# Patient Record
Sex: FEMALE | Race: WHITE | HISPANIC OR LATINO | Employment: STUDENT | URBAN - METROPOLITAN AREA
[De-identification: names, ages, dates, MRNs, and addresses within clinical notes are randomized per-mention and may not be internally consistent; named-entity substitution may affect disease eponyms.]

---
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
## 2023-04-14 ENCOUNTER — OFFICE VISIT (OUTPATIENT)
Dept: MEDICAL GROUP | Facility: PHYSICIAN GROUP | Age: 18
End: 2023-04-14
Payer: COMMERCIAL

## 2023-04-14 ENCOUNTER — HOSPITAL ENCOUNTER (OUTPATIENT)
Facility: MEDICAL CENTER | Age: 18
End: 2023-04-14
Attending: NURSE PRACTITIONER
Payer: COMMERCIAL

## 2023-04-14 ENCOUNTER — HOSPITAL ENCOUNTER (OUTPATIENT)
Dept: LAB | Facility: MEDICAL CENTER | Age: 18
End: 2023-04-14
Attending: NURSE PRACTITIONER
Payer: COMMERCIAL

## 2023-04-14 VITALS
WEIGHT: 148.38 LBS | SYSTOLIC BLOOD PRESSURE: 102 MMHG | TEMPERATURE: 98.6 F | HEIGHT: 64 IN | BODY MASS INDEX: 25.33 KG/M2 | RESPIRATION RATE: 20 BRPM | HEART RATE: 88 BPM | DIASTOLIC BLOOD PRESSURE: 64 MMHG | OXYGEN SATURATION: 99 %

## 2023-04-14 DIAGNOSIS — N89.8 VAGINAL DISCHARGE: ICD-10-CM

## 2023-04-14 DIAGNOSIS — Z32.00 ENCOUNTER FOR PREGNANCY TEST, RESULT UNKNOWN: ICD-10-CM

## 2023-04-14 DIAGNOSIS — Z11.3 SCREENING EXAMINATION FOR SEXUALLY TRANSMITTED DISEASE: ICD-10-CM

## 2023-04-14 LAB
CANDIDA DNA VAG QL PROBE+SIG AMP: POSITIVE
G VAGINALIS DNA VAG QL PROBE+SIG AMP: NEGATIVE
T VAGINALIS DNA VAG QL PROBE+SIG AMP: NEGATIVE

## 2023-04-14 PROCEDURE — 86706 HEP B SURFACE ANTIBODY: CPT

## 2023-04-14 PROCEDURE — 99214 OFFICE O/P EST MOD 30 MIN: CPT | Performed by: NURSE PRACTITIONER

## 2023-04-14 PROCEDURE — 87389 HIV-1 AG W/HIV-1&-2 AB AG IA: CPT

## 2023-04-14 PROCEDURE — 87591 N.GONORRHOEAE DNA AMP PROB: CPT

## 2023-04-14 PROCEDURE — 87340 HEPATITIS B SURFACE AG IA: CPT

## 2023-04-14 PROCEDURE — 87660 TRICHOMONAS VAGIN DIR PROBE: CPT

## 2023-04-14 PROCEDURE — 86803 HEPATITIS C AB TEST: CPT

## 2023-04-14 PROCEDURE — 36415 COLL VENOUS BLD VENIPUNCTURE: CPT

## 2023-04-14 PROCEDURE — 87491 CHLMYD TRACH DNA AMP PROBE: CPT

## 2023-04-14 PROCEDURE — 86780 TREPONEMA PALLIDUM: CPT

## 2023-04-14 PROCEDURE — 87510 GARDNER VAG DNA DIR PROBE: CPT

## 2023-04-14 PROCEDURE — 87480 CANDIDA DNA DIR PROBE: CPT

## 2023-04-14 PROCEDURE — 84703 CHORIONIC GONADOTROPIN ASSAY: CPT

## 2023-04-14 PROCEDURE — 86704 HEP B CORE ANTIBODY TOTAL: CPT

## 2023-04-14 ASSESSMENT — FIBROSIS 4 INDEX: FIB4 SCORE: 0.14

## 2023-04-14 ASSESSMENT — PATIENT HEALTH QUESTIONNAIRE - PHQ9: CLINICAL INTERPRETATION OF PHQ2 SCORE: 0
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
## 2023-04-14 NOTE — PROGRESS NOTES
Subjective  Chief Complaint  Requesting Labs    History of Present Illness  Geovanna Littlejohn is a 17 y.o. female. This established patient is here today requesting lab work for STI testing.    Screening examination for sexually transmitted disease  Geovanna is wanting to get STI testing done today. She states that her boyfriend did cheat on her and she is wanting to be tested for STI's. She states that she did have some vaginal discharge that was different.    Past Medical History    Allergies: Patient has no known allergies.  History reviewed. No pertinent past medical history.  History reviewed. No pertinent surgical history.  Current Outpatient Medications Ordered in Epic   Medication Sig Dispense Refill    drospirenone-ethinyl estradiol (LORYNA) 3-0.02 MG per tablet Take 1 Tablet by mouth every day. 84 Tablet 3     No current Epic-ordered facility-administered medications on file.     Family History:    Family History   Problem Relation Age of Onset    No Known Problems Mother     No Known Problems Father     No Known Problems Sister     No Known Problems Brother     Diabetes Maternal Grandmother     Hyperlipidemia Maternal Grandmother     Other Maternal Grandfather         cirrhosis    Alcohol/Drug Maternal Grandfather     Diabetes Paternal Grandmother     Hyperlipidemia Paternal Grandmother     Diabetes Paternal Grandfather     Hyperlipidemia Paternal Grandfather     No Known Problems Sister       Personal/Social History:    Social History     Tobacco Use    Smoking status: Never    Smokeless tobacco: Never   Vaping Use    Vaping Use: Never used   Substance Use Topics    Alcohol use: No    Drug use: No     Social History     Social History Narrative    Not on file      Review of Systems:   General: Negative for fever/chills and unexpected weight change.    Respiratory:  Negative for cough and dyspnea.    Cardiovascular:  Negative for chest pain and palpitations.  Genitourinary:  Negative for dysuria and  "hematuria. Positive for vaginal discharge.  Musculoskeletal:  Negative for myalgias.   Skin:  Negative for rash.     Objective  Physical Exam:   /64 (BP Location: Left arm, Patient Position: Sitting, BP Cuff Size: Adult)   Pulse 88   Temp 37 °C (98.6 °F) (Temporal)   Resp 20   Ht 1.626 m (5' 4\")   Wt 67.3 kg (148 lb 6 oz)   SpO2 99%  Body mass index is 25.47 kg/m².  General:  Alert and oriented.  Well appearing.  NAD  Neck: Supple without JVD. No lymphadenopathy.  Pulmonary:  Normal effort.  Clear to ausculation without rales, ronchi, or wheezing.  Cardiovascular:  Regular rate and rhythm without murmur, rubs or gallop.   Skin:  Warm and dry.  No obvious lesions.  Musculoskeletal:  No extremity cyanosis, clubbing, or edema.      Assessment/Plan  1. Screening examination for sexually transmitted disease  New diagnosis.  Geovanna is requesting STI testing today.  Discussed with her how to self swab for one of the test, she verbalized understanding.  Discussed that I will notify her of the results and if there is any need for treatment I will also let her know.  - HIV AG/AB Combo Assay Screening; Future  - T.Pallidum AB SAAD (Screening); Future  - Hepatitis C Virus Antibody; Future  - HEP B Surface Antibody; Future  - Hep B Core AB Total; Future  - Hep B Surface Antigen; Future  - Chlamydia/GC, PCR (Genital/Anal swab); Future    2. Vaginal discharge  Acute and ongoing.  She states that she has had some vaginal discharge on and off recently.  Discussed with her how to self swab for one of the test, she verbalized understanding.  - VAGINAL PATHOGENS DNA PANEL; Future    3. Encounter for pregnancy test, result unknown  She is requesting a pregnancy test at this time as she messed up her birth control one day and is wanting to make sure she is not pregnant.  Lab ordered.  - HCG QUAL SERUM; Future      Health Maintenance: Completed    Return in about 1 week (around 4/21/2023) for F/U Labs.    I have placed the above " orders and discussed them with an approved delegating provider.  The MA is performing the above orders under the direction of Dr. Stephanie Sutherland MD/DO.    Discussed that the patient carries some responsibility in management of their health care.    Please note that this dictation was created using voice recognition software. I have made every reasonable attempt to correct obvious errors, but I expect that there are errors of grammar and possibly content that I did not discover before finalizing the note.    ISAC Ordoñez  [facility]

## 2023-04-15 LAB
C TRACH DNA GENITAL QL NAA+PROBE: NEGATIVE
HBV CORE AB SERPL QL IA: NONREACTIVE
HBV SURFACE AB SERPL IA-ACNC: 15.4 MIU/ML (ref 0–10)
HBV SURFACE AG SER QL: NORMAL
HCG SERPL QL: NEGATIVE
HCV AB SER QL: NORMAL
HIV 1+2 AB+HIV1 P24 AG SERPL QL IA: NORMAL
N GONORRHOEA DNA GENITAL QL NAA+PROBE: NEGATIVE
SPECIMEN SOURCE: NORMAL
T PALLIDUM AB SER QL IA: NORMAL

## 2023-04-17 ENCOUNTER — PATIENT MESSAGE (OUTPATIENT)
Dept: HEALTH INFORMATION MANAGEMENT | Facility: OTHER | Age: 18
End: 2023-04-17

## 2023-04-17 ENCOUNTER — DOCUMENTATION (OUTPATIENT)
Dept: HEALTH INFORMATION MANAGEMENT | Facility: OTHER | Age: 18
End: 2023-04-17
Payer: COMMERCIAL

## 2023-04-18 DIAGNOSIS — B37.9 YEAST INFECTION: ICD-10-CM

## 2023-04-18 RX ORDER — FLUCONAZOLE 150 MG/1
150 TABLET ORAL ONCE
Qty: 1 TABLET | Refills: 0 | Status: SHIPPED | OUTPATIENT
Start: 2023-04-18 | End: 2023-04-18

## 2023-06-08 ENCOUNTER — TELEPHONE (OUTPATIENT)
Dept: HEALTH INFORMATION MANAGEMENT | Facility: OTHER | Age: 18
End: 2023-06-08
Payer: COMMERCIAL

## 2023-10-14 ENCOUNTER — APPOINTMENT (OUTPATIENT)
Dept: URGENT CARE | Facility: PHYSICIAN GROUP | Age: 18
End: 2023-10-14
Payer: COMMERCIAL

## 2023-10-14 ENCOUNTER — OFFICE VISIT (OUTPATIENT)
Dept: URGENT CARE | Facility: PHYSICIAN GROUP | Age: 18
End: 2023-10-14
Payer: COMMERCIAL

## 2023-10-14 VITALS
OXYGEN SATURATION: 98 % | HEIGHT: 64 IN | RESPIRATION RATE: 18 BRPM | TEMPERATURE: 98.2 F | DIASTOLIC BLOOD PRESSURE: 76 MMHG | WEIGHT: 144 LBS | BODY MASS INDEX: 24.59 KG/M2 | SYSTOLIC BLOOD PRESSURE: 116 MMHG | HEART RATE: 120 BPM

## 2023-10-14 DIAGNOSIS — R05.1 ACUTE COUGH: ICD-10-CM

## 2023-10-14 DIAGNOSIS — R00.0 TACHYCARDIA: ICD-10-CM

## 2023-10-14 DIAGNOSIS — H66.002 ACUTE SUPPURATIVE OTITIS MEDIA OF LEFT EAR WITHOUT SPONTANEOUS RUPTURE OF TYMPANIC MEMBRANE, RECURRENCE NOT SPECIFIED: ICD-10-CM

## 2023-10-14 DIAGNOSIS — J34.89 SINUS PAIN: ICD-10-CM

## 2023-10-14 PROCEDURE — 3074F SYST BP LT 130 MM HG: CPT | Performed by: STUDENT IN AN ORGANIZED HEALTH CARE EDUCATION/TRAINING PROGRAM

## 2023-10-14 PROCEDURE — 3078F DIAST BP <80 MM HG: CPT | Performed by: STUDENT IN AN ORGANIZED HEALTH CARE EDUCATION/TRAINING PROGRAM

## 2023-10-14 PROCEDURE — 99214 OFFICE O/P EST MOD 30 MIN: CPT | Performed by: STUDENT IN AN ORGANIZED HEALTH CARE EDUCATION/TRAINING PROGRAM

## 2023-10-14 RX ORDER — AMOXICILLIN AND CLAVULANATE POTASSIUM 875; 125 MG/1; MG/1
1 TABLET, FILM COATED ORAL 2 TIMES DAILY
Qty: 14 TABLET | Refills: 0 | Status: SHIPPED | OUTPATIENT
Start: 2023-10-14 | End: 2023-10-21

## 2023-10-14 RX ORDER — BENZONATATE 100 MG/1
100 CAPSULE ORAL 3 TIMES DAILY PRN
Qty: 60 CAPSULE | Refills: 0 | Status: SHIPPED | OUTPATIENT
Start: 2023-10-14

## 2023-10-14 ASSESSMENT — COPD QUESTIONNAIRES: COPD: 0

## 2023-10-14 ASSESSMENT — ENCOUNTER SYMPTOMS
HEADACHES: 1
CHILLS: 0
WHEEZING: 0
DIZZINESS: 0
SINUS PAIN: 1
ABDOMINAL PAIN: 0
CONSTIPATION: 0
FEVER: 0
MYALGIAS: 1
PALPITATIONS: 0
NAUSEA: 0
SHORTNESS OF BREATH: 0
DIARRHEA: 0
SORE THROAT: 0
COUGH: 1
VOMITING: 0

## 2023-10-14 NOTE — PROGRESS NOTES
"Subjective     Geovanna Littlejohn is a 17 y.o. female who presents with Cough (Fatigue, congestion, ear px, body aches, sinus pressure x 1 week)            Geovanna is a 17 y.o. female who presents to urgent care with symptoms of nasal congestion, sinus pain, ear pain and body aches.  Patient states symptoms started a week ago and gradually worsened on Thursday.  Since her states symptoms have remained the same.  Cough is dry in characteristic.  No shortness of breath/wheezing.  Patient states her cheeks feel sore/bruised.  She has also been experiencing headaches.  Patient has been taking OTC cold/flu medications which has not helped much with symptoms.    Cough  This is a new problem. The current episode started in the past 7 days. The cough is Non-productive. Associated symptoms include ear pain, headaches, myalgias and nasal congestion. Pertinent negatives include no chest pain, chills, fever, sore throat, shortness of breath or wheezing. There is no history of asthma, bronchitis, COPD or pneumonia.       Review of Systems   Constitutional:  Positive for malaise/fatigue. Negative for chills and fever.   HENT:  Positive for congestion, ear pain and sinus pain. Negative for sore throat.    Respiratory:  Positive for cough. Negative for shortness of breath and wheezing.    Cardiovascular:  Negative for chest pain and palpitations.   Gastrointestinal:  Negative for abdominal pain, constipation, diarrhea, nausea and vomiting.   Musculoskeletal:  Positive for myalgias.   Neurological:  Positive for headaches. Negative for dizziness.   All other systems reviewed and are negative.             Objective     /76   Pulse (!) 120   Temp 36.8 °C (98.2 °F) (Temporal)   Resp 18   Ht 1.626 m (5' 4\")   Wt 65.3 kg (144 lb)   SpO2 98%   BMI 24.72 kg/m²      Physical Exam  Vitals reviewed.   Constitutional:       General: She is not in acute distress.     Appearance: Normal appearance. She is not toxic-appearing. "   HENT:      Head: Normocephalic and atraumatic.      Right Ear: Tympanic membrane, ear canal and external ear normal.      Left Ear: Ear canal and external ear normal.      Nose: Congestion present.      Right Sinus: Maxillary sinus tenderness and frontal sinus tenderness present.      Left Sinus: Maxillary sinus tenderness and frontal sinus tenderness present.      Mouth/Throat:      Lips: Pink.      Mouth: Mucous membranes are moist.      Pharynx: Oropharynx is clear. Uvula midline.   Eyes:      Extraocular Movements: Extraocular movements intact.      Conjunctiva/sclera: Conjunctivae normal.      Pupils: Pupils are equal, round, and reactive to light.   Cardiovascular:      Rate and Rhythm: Regular rhythm. Tachycardia present.   Pulmonary:      Effort: Pulmonary effort is normal.      Breath sounds: Normal breath sounds.   Skin:     General: Skin is warm and dry.   Neurological:      General: No focal deficit present.      Mental Status: She is alert. Mental status is at baseline.                             Assessment & Plan        1. Acute suppurative otitis media of left ear without spontaneous rupture of tympanic membrane, recurrence not specified  - amoxicillin-clavulanate (AUGMENTIN) 875-125 MG Tab; Take 1 Tablet by mouth 2 times a day for 7 days.  Dispense: 14 Tablet; Refill: 0    2. Acute cough  - benzonatate (TESSALON) 100 MG Cap; Take 1 Capsule by mouth 3 times a day as needed for Cough.  Dispense: 60 Capsule; Refill: 0    3. Sinus pain    4. Tachycardia  - Asymptomatic. Secondary to systemic infection.    Differential diagnoses, supportive care measures (rest, hydration, OTC Tylenol/ibuprofen, nasal saline rinses, humidified air) and indications for immediate follow-up discussed with patient. Pathogenesis of diagnosis discussed including typical length and natural progression.      Instructed to return to urgent care or nearest emergency department if symptoms fail to improve, for any change in  condition, further concerns, or new concerning symptoms.    Patient states understanding and agrees with the plan of care and discharge instructions.

## 2023-10-14 NOTE — LETTER
October 14, 2023    To Whom It May Concern:         This is confirmation that Geovanna Littlejohn attended her scheduled appointment with Tosin Byrne P.A.-C. on 10/14/23.  Please excuse work absences through 10/17/23 for medical reasons. Geovanna can return to work without restrictions on 10/18/23 or earlier as long as symptoms have improved/resolved and she is without fever for 24 hours.         If you have any questions please do not hesitate to call me at the phone number listed below.    Sincerely,    Tosin Byrne P.A.-C.  [phone]

## 2024-04-06 ENCOUNTER — NON-PROVIDER VISIT (OUTPATIENT)
Dept: URGENT CARE | Facility: PHYSICIAN GROUP | Age: 19
End: 2024-04-06

## 2024-04-06 ENCOUNTER — APPOINTMENT (OUTPATIENT)
Dept: URGENT CARE | Facility: PHYSICIAN GROUP | Age: 19
End: 2024-04-06
Payer: COMMERCIAL

## 2024-04-06 DIAGNOSIS — Z11.1 PPD SCREENING TEST: ICD-10-CM

## 2024-04-06 PROCEDURE — 86580 TB INTRADERMAL TEST: CPT | Performed by: FAMILY MEDICINE

## 2024-04-06 NOTE — PROGRESS NOTES
Geovanna Littlejohn is a 18 y.o. female here for a non-provider visit for PPD placement -- Step 1 of 2    Reason for PPD:  work requirement    1. TB evaluation questionnaire completed by patient? Yes      -  If any answers marked yes did you contact a provider prior to placing? Not Indicated  2.  Patient notified to return to clinic for reading on: After Monday (4-8-24) 3:47 PM and before Tuesday 4-9-24 3:47 PM  3.  PPD Placement documentation completed on TB evaluation questionnaire? YES   4.  Location of TB evaluation questionnaire filed: [facility]

## 2024-04-08 ENCOUNTER — APPOINTMENT (OUTPATIENT)
Dept: URGENT CARE | Facility: PHYSICIAN GROUP | Age: 19
End: 2024-04-08
Payer: COMMERCIAL

## 2024-04-08 ENCOUNTER — NON-PROVIDER VISIT (OUTPATIENT)
Dept: URGENT CARE | Facility: PHYSICIAN GROUP | Age: 19
End: 2024-04-08

## 2024-04-08 LAB — TB WHEAL 3D P 5 TU DIAM: NORMAL MM

## 2024-04-10 ENCOUNTER — OFFICE VISIT (OUTPATIENT)
Dept: URGENT CARE | Facility: PHYSICIAN GROUP | Age: 19
End: 2024-04-10
Payer: COMMERCIAL

## 2024-04-10 ENCOUNTER — HOSPITAL ENCOUNTER (OUTPATIENT)
Facility: MEDICAL CENTER | Age: 19
End: 2024-04-10
Payer: COMMERCIAL

## 2024-04-10 VITALS
BODY MASS INDEX: 24.38 KG/M2 | TEMPERATURE: 98.6 F | WEIGHT: 142.8 LBS | HEART RATE: 96 BPM | HEIGHT: 64 IN | OXYGEN SATURATION: 100 % | SYSTOLIC BLOOD PRESSURE: 102 MMHG | DIASTOLIC BLOOD PRESSURE: 58 MMHG | RESPIRATION RATE: 16 BRPM

## 2024-04-10 DIAGNOSIS — R30.0 DYSURIA: ICD-10-CM

## 2024-04-10 DIAGNOSIS — N30.00 ACUTE CYSTITIS WITHOUT HEMATURIA: ICD-10-CM

## 2024-04-10 LAB
APPEARANCE UR: NORMAL
BILIRUB UR STRIP-MCNC: NEGATIVE MG/DL
COLOR UR AUTO: NORMAL
GLUCOSE UR STRIP.AUTO-MCNC: NEGATIVE MG/DL
KETONES UR STRIP.AUTO-MCNC: NORMAL MG/DL
LEUKOCYTE ESTERASE UR QL STRIP.AUTO: NEGATIVE
NITRITE UR QL STRIP.AUTO: NEGATIVE
PH UR STRIP.AUTO: 6 [PH] (ref 5–8)
POCT INT CON NEG: NEGATIVE
POCT INT CON POS: POSITIVE
POCT URINE PREGNANCY TEST: NEGATIVE
PROT UR QL STRIP: NEGATIVE MG/DL
RBC UR QL AUTO: NEGATIVE
SP GR UR STRIP.AUTO: 1.02
UROBILINOGEN UR STRIP-MCNC: NORMAL MG/DL

## 2024-04-10 PROCEDURE — 3074F SYST BP LT 130 MM HG: CPT

## 2024-04-10 PROCEDURE — 87086 URINE CULTURE/COLONY COUNT: CPT

## 2024-04-10 PROCEDURE — 81025 URINE PREGNANCY TEST: CPT

## 2024-04-10 PROCEDURE — 99213 OFFICE O/P EST LOW 20 MIN: CPT

## 2024-04-10 PROCEDURE — 3078F DIAST BP <80 MM HG: CPT

## 2024-04-10 PROCEDURE — 81002 URINALYSIS NONAUTO W/O SCOPE: CPT

## 2024-04-10 RX ORDER — NITROFURANTOIN 25; 75 MG/1; MG/1
100 CAPSULE ORAL 2 TIMES DAILY
Qty: 10 CAPSULE | Refills: 0 | Status: SHIPPED | OUTPATIENT
Start: 2024-04-10 | End: 2024-04-15

## 2024-04-10 ASSESSMENT — ENCOUNTER SYMPTOMS
DIZZINESS: 0
WEAKNESS: 0
FEVER: 0
DIARRHEA: 0
NECK PAIN: 0
VOMITING: 0
MYALGIAS: 0
COUGH: 0
NAUSEA: 0
SHORTNESS OF BREATH: 0
SORE THROAT: 0
STRIDOR: 0
FLANK PAIN: 0
CHILLS: 0
BACK PAIN: 0

## 2024-04-10 ASSESSMENT — VISUAL ACUITY: OU: 1

## 2024-04-10 NOTE — PROGRESS NOTES
Subjective     Geovanna Littlejohn is a 18 y.o. female who presents with dysuria x2 days.     HPI:   Geovanna is a 19yo female presenting for dysuria x2 days. Reports burning with urination and reports symptoms feel similar to previous urinary tract infection episodes. Reports associated lower abdominal discomfort exacerbated with urination, located in the lower abdomen region not specific to any side. Attempted Azo with moderate relief. Denies abnormal vaginal discharge or itching. No flank pain, denies fevers/chills. BMs regular. Denies hematuria. LMP 3 weeks ago. Denies vomiting or diarrhea. No shortness of breath.       Review of Systems   Constitutional:  Negative for chills, fever and malaise/fatigue.   HENT:  Negative for sore throat.    Respiratory:  Negative for cough, shortness of breath and stridor.    Gastrointestinal:  Negative for diarrhea, nausea and vomiting.   Genitourinary:  Positive for dysuria, frequency and urgency. Negative for flank pain and hematuria.   Musculoskeletal:  Negative for back pain, myalgias and neck pain.   Neurological:  Negative for dizziness and weakness.     History reviewed. No pertinent past medical history.     History reviewed. No pertinent surgical history.     Patient has no known allergies.     Current Outpatient Medications:     drospirenone-ethinyl estradiol (LORYNA) 3-0.02 MG per tablet, Take 1 Tablet by mouth every day., Disp: 84 Tablet, Rfl: 3    Social History     Tobacco Use    Smoking status: Never    Smokeless tobacco: Never   Vaping Use    Vaping Use: Never used   Substance Use Topics    Alcohol use: No    Drug use: No      Family History   Problem Relation Age of Onset    No Known Problems Mother     No Known Problems Father     No Known Problems Sister     No Known Problems Brother     Diabetes Maternal Grandmother     Hyperlipidemia Maternal Grandmother     Other Maternal Grandfather         cirrhosis    Alcohol/Drug Maternal Grandfather     Diabetes  "Paternal Grandmother     Hyperlipidemia Paternal Grandmother     Diabetes Paternal Grandfather     Hyperlipidemia Paternal Grandfather     No Known Problems Sister       Medications, Allergies, and current problem list reviewed today in Epic.      Objective     /58 (BP Location: Left arm, Patient Position: Sitting, BP Cuff Size: Adult)   Pulse 96   Temp 37 °C (98.6 °F) (Temporal)   Resp 16   Ht 1.626 m (5' 4\")   Wt 64.8 kg (142 lb 12.8 oz)   SpO2 100%   BMI 24.51 kg/m²      Physical Exam  Vitals reviewed.   Constitutional:       General: She is not in acute distress.  HENT:      Nose: Nose normal.      Mouth/Throat:      Mouth: Mucous membranes are moist.      Pharynx: Uvula midline. No oropharyngeal exudate or posterior oropharyngeal erythema.   Eyes:      General: Vision grossly intact. Gaze aligned appropriately. No visual field deficit.     Extraocular Movements: Extraocular movements intact.      Conjunctiva/sclera: Conjunctivae normal.      Pupils: Pupils are equal, round, and reactive to light.   Cardiovascular:      Rate and Rhythm: Normal rate and regular rhythm.      Pulses: Normal pulses.      Heart sounds: Normal heart sounds.   Pulmonary:      Effort: Pulmonary effort is normal. No tachypnea, accessory muscle usage, prolonged expiration, respiratory distress or retractions.      Breath sounds: Normal breath sounds. No stridor. No wheezing, rhonchi or rales.   Abdominal:      Tenderness: There is no right CVA tenderness or left CVA tenderness.   Musculoskeletal:      Cervical back: Full passive range of motion without pain, normal range of motion and neck supple. Normal range of motion.   Skin:     General: Skin is warm and dry.   Neurological:      Mental Status: She is alert. Mental status is at baseline.   Psychiatric:         Mood and Affect: Mood normal.         Behavior: Behavior normal.         Thought Content: Thought content normal.       Results for orders placed or performed in " visit on 04/10/24   POCT Pregnancy   Result Value Ref Range    POC Urine Pregnancy Test Negative     Internal Control Positive Positive     Internal Control Negative Negative    POCT Urinalysis   Result Value Ref Range    POC Color dark yellow Negative    POC Appearance slightly cloudy Negative    POC Glucose negative Negative mg/dL    POC Bilirubin negative Negative mg/dL    POC Ketones 15 mg/dl Negative mg/dL    POC Specific Gravity 1.025 <1.005 - >1.030    POC Blood negative Negative    POC Urine PH 6.0 5.0 - 8.0    POC Protein negative Negative mg/dL    POC Urobiligen 0.2 E.U./dl Negative (0.2) mg/dL    POC Nitrites negative Negative    POC Leukocyte Esterase negative Negative     Assessment & Plan     1. Dysuria   - POCT Pregnancy  - POCT Urinalysis  - URINE CULTURE(NEW); Future    2. Acute cystitis without hematuria   - nitrofurantoin (MACROBID) 100 MG Cap; Take 1 Capsule by mouth 2 times a day for 5 days.  Dispense: 10 Capsule; Refill: 0       MDM/Comments:   Urinalysis without clear evidence of infection. Patient took Azo potentially interfering with POCT result. Symptom presentation consistent with diagnosis of acute cystitis. No CVA tenderness, fever/chills, or flank pain.   Patient will be prescribed Nitrofurantoin. Will obtain urine culture and notify patient of result, at which time will advise patient to continue or stop antibiotic therapy based on result.        Illness progression and alarm symptoms discussed with patient, emphasizing low threshold for returning to clinic/emergency department for worsening symptoms. Patient is agreeable to the plan and verbalizes understanding, and will follow up if warranted. Will return if body aches, chills, fever, back/flank pain occur. Discussed signs of kidney infection.      Differential diagnosis, natural history, supportive care, and indications for immediate follow-up discussed.      Follow-up as needed if symptoms worsen or fail to improve to PCP, Urgent  care or Emergency Room.               Electronically signed by ISAC Martinez

## 2024-04-13 LAB
BACTERIA UR CULT: NORMAL
SIGNIFICANT IND 70042: NORMAL
SITE SITE: NORMAL
SOURCE SOURCE: NORMAL

## 2024-12-12 ENCOUNTER — HOSPITAL ENCOUNTER (OUTPATIENT)
Facility: MEDICAL CENTER | Age: 19
End: 2024-12-12
Attending: PHYSICIAN ASSISTANT
Payer: COMMERCIAL

## 2024-12-12 PROCEDURE — 87481 CANDIDA DNA AMP PROBE: CPT | Mod: 91

## 2024-12-12 PROCEDURE — 87661 TRICHOMONAS VAGINALIS AMPLIF: CPT

## 2024-12-12 PROCEDURE — 81513 NFCT DS BV RNA VAG FLU ALG: CPT

## 2024-12-15 LAB
BV BACTERIA DNA VAG QL NAA+PROBE: POSITIVE
C GLABRATA DNA VAG QL NAA+PROBE: NEGATIVE
CANDIDA DNA VAG QL NAA+PROBE: NEGATIVE
T VAGINALIS DNA VAG QL NAA+PROBE: NEGATIVE

## 2025-04-05 ENCOUNTER — OFFICE VISIT (OUTPATIENT)
Dept: URGENT CARE | Facility: PHYSICIAN GROUP | Age: 20
End: 2025-04-05
Payer: COMMERCIAL

## 2025-04-05 VITALS
HEART RATE: 97 BPM | RESPIRATION RATE: 16 BRPM | OXYGEN SATURATION: 100 % | BODY MASS INDEX: 28.37 KG/M2 | TEMPERATURE: 97.7 F | HEIGHT: 61 IN | DIASTOLIC BLOOD PRESSURE: 70 MMHG | SYSTOLIC BLOOD PRESSURE: 110 MMHG | WEIGHT: 150.24 LBS

## 2025-04-05 DIAGNOSIS — J06.9 VIRAL URI: ICD-10-CM

## 2025-04-05 PROCEDURE — 3078F DIAST BP <80 MM HG: CPT

## 2025-04-05 PROCEDURE — 99213 OFFICE O/P EST LOW 20 MIN: CPT

## 2025-04-05 PROCEDURE — 3074F SYST BP LT 130 MM HG: CPT

## 2025-04-05 ASSESSMENT — ENCOUNTER SYMPTOMS
COUGH: 0
SINUS PAIN: 1
SORE THROAT: 0
CHILLS: 0
FEVER: 0

## 2025-04-05 NOTE — PROGRESS NOTES
CHIEF COMPLAINT  Chief Complaint   Patient presents with    Otalgia     (L) ear pain, sinus congestion, (L) cheek pain, did have a sore throat X last week on Friday      Subjective:   Geovanna Littlejohn is a 19 y.o. female who presents to urgent care with concerns for left-sided ear pain and left sinus congestion x 2 days.  Patient reports symptoms of congestion started approximately 1 week ago.  She does report associated symptoms of sore throat which ultimately resolved.  Denies any fevers or chills.  Does report taking OTC for alleviation of sore throat.  Denies any use of OTC nasal decongestants.  No other pertinent history.       Review of Systems   Constitutional:  Negative for chills and fever.   HENT:  Positive for ear pain and sinus pain. Negative for sore throat.    Respiratory:  Negative for cough.        PAST MEDICAL HISTORY  Patient Active Problem List    Diagnosis Date Noted    Screening examination for sexually transmitted disease 04/14/2023    Encounter for counseling 12/21/2022    Encounter for initial prescription of contraceptive pills 05/11/2021       SURGICAL HISTORY  patient denies any surgical history    ALLERGIES  No Known Allergies    CURRENT MEDICATIONS  Home Medications       Reviewed by Ham Amato Ass't (Medical Assistant) on 04/05/25 at 1539  Med List Status: <None>     Medication Last Dose Status   benzonatate (TESSALON) 100 MG Cap Not Taking Active   drospirenone-ethinyl estradiol (LORYNA) 3-0.02 MG per tablet Taking Active                    SOCIAL HISTORY  Social History     Tobacco Use    Smoking status: Never    Smokeless tobacco: Never   Vaping Use    Vaping status: Never Used   Substance and Sexual Activity    Alcohol use: No    Drug use: No    Sexual activity: Yes     Partners: Male     Birth control/protection: Pill, Condom       FAMILY HISTORY  Family History   Problem Relation Age of Onset    No Known Problems Mother     No Known Problems Father     No Known  "Problems Sister     No Known Problems Brother     Diabetes Maternal Grandmother     Hyperlipidemia Maternal Grandmother     Other Maternal Grandfather         cirrhosis    Alcohol/Drug Maternal Grandfather     Diabetes Paternal Grandmother     Hyperlipidemia Paternal Grandmother     Diabetes Paternal Grandfather     Hyperlipidemia Paternal Grandfather     No Known Problems Sister          Medications, Allergies, and current problem list reviewed today in Epic.     Objective:     /70 (BP Location: Left arm, Patient Position: Sitting, BP Cuff Size: Adult)   Pulse 97   Temp 36.5 °C (97.7 °F) (Temporal)   Resp 16   Ht 1.549 m (5' 1\")   Wt 68.2 kg (150 lb 3.9 oz)   SpO2 100%     Physical Exam  Vitals reviewed.   Constitutional:       General: She is not in acute distress.     Appearance: Normal appearance. She is not ill-appearing or toxic-appearing.   HENT:      Head: Normocephalic.      Right Ear: Tympanic membrane normal.      Left Ear: Tympanic membrane normal.      Nose: Nose normal. No congestion.      Mouth/Throat:      Mouth: Mucous membranes are moist.      Pharynx: Oropharynx is clear. No oropharyngeal exudate or posterior oropharyngeal erythema.   Cardiovascular:      Rate and Rhythm: Normal rate.      Pulses: Normal pulses.   Pulmonary:      Effort: Pulmonary effort is normal. No respiratory distress.      Breath sounds: No stridor. No wheezing, rhonchi or rales.   Musculoskeletal:      Cervical back: Neck supple.   Skin:     General: Skin is warm.   Neurological:      General: No focal deficit present.      Mental Status: She is alert.   Psychiatric:         Mood and Affect: Mood normal.         Assessment/Plan:     Diagnosis and associated orders:     1. Viral URI           Comments/MDM:     The patient presents with symptoms suspicious for likely viral upper respiratory infection. Differential includes bacterial pneumonia, sinusitis, allergic rhinitis.  Patient is nontoxic appearing and not in " need of emergent medical intervention. They have a normal pulse oximetry on room air, afebrile, and a normal pulmonary exam. Overall, the patient is very well appearing. I do not feel that this patient would benefit from antibiotics at this time.   Recommended symptomatic and supportive care at this time that includes plenty of fluids, rest, Tylenol/Ibuprofen for pain/fever, warm salt water gargles for sore throat, OTC cough and decongestant medication, Flonase, nasal saline washes.           Differential diagnosis, natural history, supportive care, and indications for immediate follow-up discussed.    Advised the patient to follow-up with the primary care physician for recheck, reevaluation, and consideration of further management.    Please note that this dictation was created using voice recognition software. I have made a reasonable attempt to correct obvious errors, but I expect that there are errors of grammar and possibly content that I did not discover before finalizing the note.    This note was electronically signed by RADHA Naidu